# Patient Record
Sex: MALE | Race: WHITE
[De-identification: names, ages, dates, MRNs, and addresses within clinical notes are randomized per-mention and may not be internally consistent; named-entity substitution may affect disease eponyms.]

---

## 2017-12-11 PROBLEM — Z00.00 ENCOUNTER FOR PREVENTIVE HEALTH EXAMINATION: Status: ACTIVE | Noted: 2017-12-11

## 2022-06-18 ENCOUNTER — NON-APPOINTMENT (OUTPATIENT)
Age: 62
End: 2022-06-18

## 2022-07-14 ENCOUNTER — NON-APPOINTMENT (OUTPATIENT)
Age: 62
End: 2022-07-14

## 2022-11-16 ENCOUNTER — APPOINTMENT (OUTPATIENT)
Dept: ORTHOPEDIC SURGERY | Facility: CLINIC | Age: 62
End: 2022-11-16

## 2022-11-16 VITALS — BODY MASS INDEX: 31.5 KG/M2 | WEIGHT: 225 LBS | HEIGHT: 71 IN

## 2022-11-16 DIAGNOSIS — I10 ESSENTIAL (PRIMARY) HYPERTENSION: ICD-10-CM

## 2022-11-16 DIAGNOSIS — M25.611 STIFFNESS OF RIGHT SHOULDER, NOT ELSEWHERE CLASSIFIED: ICD-10-CM

## 2022-11-16 DIAGNOSIS — E78.00 PURE HYPERCHOLESTEROLEMIA, UNSPECIFIED: ICD-10-CM

## 2022-11-16 PROCEDURE — 73010 X-RAY EXAM OF SHOULDER BLADE: CPT | Mod: RT

## 2022-11-16 PROCEDURE — 99204 OFFICE O/P NEW MOD 45 MIN: CPT

## 2022-11-16 PROCEDURE — 73030 X-RAY EXAM OF SHOULDER: CPT | Mod: RT

## 2022-11-16 RX ORDER — DICLOFENAC SODIUM AND MISOPROSTOL 75; 200 MG/1; UG/1
75-0.2 TABLET, DELAYED RELEASE ORAL
Qty: 60 | Refills: 0 | Status: ACTIVE | COMMUNITY
Start: 2022-11-16 | End: 1900-01-01

## 2022-11-16 NOTE — CONSULT LETTER
[Dear  ___] : Dear  [unfilled], [Consult Letter:] : I had the pleasure of evaluating your patient, [unfilled]. [Please see my note below.] : Please see my note below. [Consult Closing:] : Thank you very much for allowing me to participate in the care of this patient.  If you have any questions, please do not hesitate to contact me. [Sincerely,] : Sincerely, [FreeTextEntry3] : Shamar Poole M.D.\[facility] Shoulder Surgery

## 2022-11-16 NOTE — PHYSICAL EXAM
[Right] : right shoulder [Left] : left shoulder [Moderate] : moderate [Mild] : mild [5 ___] : forward flexion 5[unfilled]/5 [] : no sensory deficits [Sitting] : sitting [FreeTextEntry9] : IR to T10. [TWNoteComboBox6] : internal rotation L2 [TWNoteComboBox4] : passive forward flexion 160 degrees [de-identified] : external rotation 50 degrees

## 2022-11-16 NOTE — REASON FOR VISIT
[FreeTextEntry2] : This is a 61 year old RHD male retired  with right shoulder pain since mid October 2022 without injury. Pain is to the posterior aspect of the shoulder. No prior history/treatment. Reaching overhead is painful. Night symptoms can occur. There is no n/t. NSAID use as needed with temporary relief.

## 2022-11-16 NOTE — ASSESSMENT
[FreeTextEntry1] : We discussed the underlying pathology. \par Treatment options reviewed. \par PT is prescribed. \par Arthrotec is prescribed.\par Cautions discussed. \par Questions answered. \par \par Patient seen by Shamar Alaniz M.D.\par Entered by Sharona Ritchie acting as scribe.

## 2022-11-16 NOTE — IMAGING
[Right] : right shoulder [FreeTextEntry1] : The GH joint is ok. There are AC cysts and spurs.  [FreeTextEntry5] : There is a type II acromion.

## 2022-11-16 NOTE — HISTORY OF PRESENT ILLNESS
[9] : 9 [0] : 0 [Dull/Aching] : dull/aching [Sharp] : sharp [Intermittent] : intermittent [Retired] : Work status: retired [] : no [FreeTextEntry1] : righ sergio   arm [FreeTextEntry9] : no treatment [de-identified] : reaching and lifting

## 2022-11-17 ENCOUNTER — APPOINTMENT (OUTPATIENT)
Dept: ORTHOPEDIC SURGERY | Facility: CLINIC | Age: 62
End: 2022-11-17

## 2022-12-21 ENCOUNTER — APPOINTMENT (OUTPATIENT)
Dept: ORTHOPEDIC SURGERY | Facility: CLINIC | Age: 62
End: 2022-12-21

## 2023-01-10 ENCOUNTER — NON-APPOINTMENT (OUTPATIENT)
Age: 63
End: 2023-01-10

## 2023-01-18 ENCOUNTER — APPOINTMENT (OUTPATIENT)
Dept: ORTHOPEDIC SURGERY | Facility: CLINIC | Age: 63
End: 2023-01-18

## 2023-05-10 ENCOUNTER — FORM ENCOUNTER (OUTPATIENT)
Age: 63
End: 2023-05-10

## 2023-05-10 ENCOUNTER — APPOINTMENT (OUTPATIENT)
Dept: ORTHOPEDIC SURGERY | Facility: CLINIC | Age: 63
End: 2023-05-10
Payer: COMMERCIAL

## 2023-05-10 PROCEDURE — 99214 OFFICE O/P EST MOD 30 MIN: CPT

## 2023-05-10 PROCEDURE — 72170 X-RAY EXAM OF PELVIS: CPT

## 2023-05-10 PROCEDURE — 72110 X-RAY EXAM L-2 SPINE 4/>VWS: CPT

## 2023-05-10 RX ORDER — METHYLPREDNISOLONE 4 MG/1
4 TABLET ORAL
Qty: 1 | Refills: 0 | Status: ACTIVE | COMMUNITY
Start: 2023-05-10 | End: 1900-01-01

## 2023-05-10 NOTE — DISCUSSION/SUMMARY
[de-identified] : reviewed the imaging \par questions answered \par MRi L spine \par PT \par MPD \par fu to review

## 2023-05-10 NOTE — HISTORY OF PRESENT ILLNESS
[Lower back] : lower back [Gradual] : gradual [Sudden] : sudden [7] : 7 [5] : 5 [Burning] : burning [Shooting] : shooting [Stabbing] : stabbing [Intermittent] : intermittent [Rest] : rest [Exercising] : exercising [Retired] : Work status: retired [de-identified] : 5/10/23: 63 yo RHD m here today for the evaluation of his low back; Pain to the right side of the low back that has been intermittent since 2022. Pain is inconsistent. No leg pain. Pain is relieved while in bed on laying on his side. No n/t.  No loss of b/b  control \par \par No prior spine injuries\par No RYAN/prior spine surgeries\par Took aleve once\par No recent PT/Chirocare\par No recent MRi \par tried a brace for a day or so \par \par Pmhx: HTN, Hyperlipidemia\par No hx diabetes/cancer\par \par retired  \par \par xrays today\par L-spine 4V; lumbar spondylosis\par AP pelvis; no fractures  [] : Post Surgical Visit: no [FreeTextEntry1] : L spine  [FreeTextEntry5] : Pt has had a gradual onset of l spine pain for the past year and a half that has gotten worse over time, pt has a sharp pain on the right side of his l spine with sitting for a long period of time  [de-identified] : none

## 2023-05-11 ENCOUNTER — APPOINTMENT (OUTPATIENT)
Dept: MRI IMAGING | Facility: CLINIC | Age: 63
End: 2023-05-11
Payer: COMMERCIAL

## 2023-05-11 PROCEDURE — 72148 MRI LUMBAR SPINE W/O DYE: CPT

## 2023-05-17 ENCOUNTER — APPOINTMENT (OUTPATIENT)
Dept: ORTHOPEDIC SURGERY | Facility: CLINIC | Age: 63
End: 2023-05-17
Payer: COMMERCIAL

## 2023-05-17 DIAGNOSIS — M51.26 OTHER INTERVERTEBRAL DISC DISPLACEMENT, LUMBAR REGION: ICD-10-CM

## 2023-05-17 DIAGNOSIS — M75.41 IMPINGEMENT SYNDROME OF RIGHT SHOULDER: ICD-10-CM

## 2023-05-17 DIAGNOSIS — M47.816 SPONDYLOSIS W/OUT MYELOPATHY OR RADICULOPATHY, LUMBAR REGION: ICD-10-CM

## 2023-05-17 PROCEDURE — 99214 OFFICE O/P EST MOD 30 MIN: CPT

## 2023-05-17 NOTE — DISCUSSION/SUMMARY
[de-identified] : reviewed the MRI \par questoins answered \par has moderate stenosis from L3-5 \par discussion of optoins \par PT would be the start\par if that not helping consider RYAN \par

## 2023-05-17 NOTE — HISTORY OF PRESENT ILLNESS
[Lower back] : lower back [Gradual] : gradual [Sudden] : sudden [7] : 7 [5] : 5 [Burning] : burning [Shooting] : shooting [Stabbing] : stabbing [Intermittent] : intermittent [Rest] : rest [Exercising] : exercising [Retired] : Work status: retired [de-identified] : 5/10/23: 61 yo RHD m here today for the evaluation of his low back; Pain to the right side of the low back that has been intermittent since 2022. Pain is inconsistent. No leg pain. Pain is relieved while in bed on laying on his side. No n/t.  No loss of b/b  control \par \par No prior spine injuries\par No RYAN/prior spine surgeries\par Took aleve once\par No recent PT/Chirocare\par No recent MRi \par tried a brace for a day or so \par \par Pmhx: HTN, Hyperlipidemia\par No hx diabetes/cancer\par \par retired  \par \par xrays today\par L-spine 4V; lumbar spondylosis\par AP pelvis; no fractures \par \par 5/17/23: Here for fu to review the MRI lumbar spine - plan at last was "reviewed the imaging \par questions answered \par MRi L spine \par PT \par MPD \par fu to review" - overall some relief with the oral steroids \par \par MRI L-spine 5/11/23\par 1. Convex left curvature.\par 2. Modic type 1 endplate change at the anterior margin of L2-L3 to the right of midline with no fracture.\par 3. T12-L1: Loss of disc signal and height.\par 4. L1-L2: Loss of disc signal and height. Broad bulge, facet arthrosis, ligamentum flavum hypertrophy, and\par facet effusion.\par 5. L2-L3: Broad bulge, facet arthrosis, and ligamentum flavum hypertrophy with facet effusion. Foraminal \par stenosis.\par 6. L3-L4: Minor retrolisthesis. Broad bulge, facet arthrosis, ligamentum flavum hypertrophy, and facet effusion\par with foraminal extension of bulges, left foraminal herniation, annular fissure, and foraminal stenosis left greater \par than right. Mild central stenosis.\par 7. L4-L5: Broad bulge, spondylotic ridge, facet arthrosis, and ligamentum flavum hypertrophy with inferior\par foraminal stenosis. Posterior Modic type 2 endplate change. Broad herniation impressing on the thecal sac with\par mild central stenosis.\par 8. L5-S1: Broad bulge and facet arthrosis. Broad central herniation impressing on the thecal sac with no mass \par effect on the S1 nerve roots. [] : Post Surgical Visit: no [FreeTextEntry1] : L spine  [FreeTextEntry5] : Pt has had a gradual onset of l spine pain for the past year and a half that has gotten worse over time, pt has a sharp pain on the right side of his l spine with sitting for a long period of time  [de-identified] : none

## 2024-01-21 ENCOUNTER — OFFICE (OUTPATIENT)
Dept: URBAN - METROPOLITAN AREA CLINIC 1 | Facility: CLINIC | Age: 64
Setting detail: OPHTHALMOLOGY
End: 2024-01-21
Payer: COMMERCIAL

## 2024-01-21 DIAGNOSIS — B30.9: ICD-10-CM

## 2024-01-21 DIAGNOSIS — G90.2: ICD-10-CM

## 2024-01-21 PROCEDURE — 99203 OFFICE O/P NEW LOW 30 MIN: CPT

## 2024-01-21 ASSESSMENT — REFRACTION_AUTOREFRACTION
OD_AXIS: 075
OD_SPHERE: +0.25
OS_SPHERE: PLANO
OS_CYLINDER: -0.25
OS_AXIS: 085
OD_CYLINDER: -1.25

## 2024-01-21 ASSESSMENT — REFRACTION_CURRENTRX
OD_CYLINDER: -1.50
OD_ADD: +2.50
OD_VPRISM_DIRECTION: PROGS
OS_ADD: +2.50
OD_OVR_VA: 20/
OS_VPRISM_DIRECTION: PROGS
OS_AXIS: 099
OS_SPHERE: +0.25
OD_AXIS: 081
OS_CYLINDER: -1.25
OD_SPHERE: PLANO
OS_OVR_VA: 20/

## 2024-01-21 ASSESSMENT — CONFRONTATIONAL VISUAL FIELD TEST (CVF)
OD_FINDINGS: FULL
OS_FINDINGS: FULL

## 2024-01-21 ASSESSMENT — SPHEQUIV_DERIVED: OD_SPHEQUIV: -0.375

## 2024-02-05 ENCOUNTER — OFFICE (OUTPATIENT)
Dept: URBAN - METROPOLITAN AREA CLINIC 6 | Facility: CLINIC | Age: 64
Setting detail: OPHTHALMOLOGY
End: 2024-02-05
Payer: COMMERCIAL

## 2024-02-05 DIAGNOSIS — G90.2: ICD-10-CM

## 2024-02-05 DIAGNOSIS — B30.9: ICD-10-CM

## 2024-02-05 PROCEDURE — 99213 OFFICE O/P EST LOW 20 MIN: CPT

## 2024-02-05 ASSESSMENT — REFRACTION_CURRENTRX
OS_SPHERE: +0.25
OD_AXIS: 082
OS_CYLINDER: -1.00
OS_VPRISM_DIRECTION: PROGS
OS_AXIS: 112
OD_OVR_VA: 20/
OS_OVR_VA: 20/
OS_ADD: +2.25
OD_ADD: +2.25
OD_CYLINDER: -1.75
OD_SPHERE: +0.25
OD_VPRISM_DIRECTION: PROGS

## 2024-02-05 ASSESSMENT — REFRACTION_AUTOREFRACTION
OS_CYLINDER: -1.50
OD_AXIS: 079
OD_CYLINDER: -1.25
OD_SPHERE: PLANO
OS_AXIS: 105
OS_SPHERE: PLANO

## 2024-02-05 ASSESSMENT — CONFRONTATIONAL VISUAL FIELD TEST (CVF)
OD_FINDINGS: FULL
OS_FINDINGS: FULL

## 2025-04-30 ENCOUNTER — TRANSCRIPTION ENCOUNTER (OUTPATIENT)
Age: 65
End: 2025-04-30

## 2025-04-30 ENCOUNTER — APPOINTMENT (OUTPATIENT)
Dept: ORTHOPEDIC SURGERY | Facility: CLINIC | Age: 65
End: 2025-04-30
Payer: MEDICARE

## 2025-04-30 VITALS — WEIGHT: 188 LBS | BODY MASS INDEX: 26.32 KG/M2 | HEIGHT: 71 IN

## 2025-04-30 DIAGNOSIS — M75.42 IMPINGEMENT SYNDROME OF LEFT SHOULDER: ICD-10-CM

## 2025-04-30 DIAGNOSIS — Z78.9 OTHER SPECIFIED HEALTH STATUS: ICD-10-CM

## 2025-04-30 DIAGNOSIS — M25.612 STIFFNESS OF LEFT SHOULDER, NOT ELSEWHERE CLASSIFIED: ICD-10-CM

## 2025-04-30 DIAGNOSIS — M75.32 CALCIFIC TENDINITIS OF LEFT SHOULDER: ICD-10-CM

## 2025-04-30 PROCEDURE — 99213 OFFICE O/P EST LOW 20 MIN: CPT

## 2025-04-30 PROCEDURE — 73010 X-RAY EXAM OF SHOULDER BLADE: CPT | Mod: LT

## 2025-04-30 PROCEDURE — 73030 X-RAY EXAM OF SHOULDER: CPT | Mod: LT

## 2025-04-30 RX ORDER — ROSUVASTATIN CALCIUM 5 MG/1
TABLET, FILM COATED ORAL
Refills: 0 | Status: ACTIVE | COMMUNITY

## 2025-04-30 RX ORDER — DICLOFENAC SODIUM AND MISOPROSTOL 75; 200 MG/1; UG/1
75-0.2 TABLET, DELAYED RELEASE ORAL
Qty: 60 | Refills: 0 | Status: ACTIVE | COMMUNITY
Start: 2025-04-30 | End: 1900-01-01

## 2025-04-30 RX ORDER — LOSARTAN POTASSIUM 100 MG/1
TABLET, FILM COATED ORAL
Refills: 0 | Status: ACTIVE | COMMUNITY

## 2025-06-04 ENCOUNTER — APPOINTMENT (OUTPATIENT)
Dept: ORTHOPEDIC SURGERY | Facility: CLINIC | Age: 65
End: 2025-06-04
Payer: MEDICARE

## 2025-06-04 VITALS — WEIGHT: 188 LBS | BODY MASS INDEX: 26.32 KG/M2 | HEIGHT: 71 IN

## 2025-06-04 DIAGNOSIS — M25.612 STIFFNESS OF LEFT SHOULDER, NOT ELSEWHERE CLASSIFIED: ICD-10-CM

## 2025-06-04 DIAGNOSIS — M75.42 IMPINGEMENT SYNDROME OF LEFT SHOULDER: ICD-10-CM

## 2025-06-04 PROCEDURE — 20611 DRAIN/INJ JOINT/BURSA W/US: CPT | Mod: 59,LT

## 2025-06-04 PROCEDURE — 99214 OFFICE O/P EST MOD 30 MIN: CPT | Mod: 25

## 2025-07-16 ENCOUNTER — RESULT REVIEW (OUTPATIENT)
Age: 65
End: 2025-07-16

## 2025-07-16 ENCOUNTER — APPOINTMENT (OUTPATIENT)
Dept: ORTHOPEDIC SURGERY | Facility: CLINIC | Age: 65
End: 2025-07-16
Payer: MEDICARE

## 2025-07-16 VITALS — WEIGHT: 188 LBS | BODY MASS INDEX: 26.32 KG/M2 | HEIGHT: 71 IN

## 2025-07-16 PROCEDURE — 99213 OFFICE O/P EST LOW 20 MIN: CPT

## 2025-07-23 ENCOUNTER — APPOINTMENT (OUTPATIENT)
Dept: ORTHOPEDIC SURGERY | Facility: CLINIC | Age: 65
End: 2025-07-23
Payer: MEDICARE

## 2025-07-23 PROCEDURE — 73030 X-RAY EXAM OF SHOULDER: CPT | Mod: LT

## 2025-07-23 PROCEDURE — 99214 OFFICE O/P EST MOD 30 MIN: CPT

## 2025-07-28 ENCOUNTER — APPOINTMENT (OUTPATIENT)
Dept: MRI IMAGING | Facility: CLINIC | Age: 65
End: 2025-07-28

## 2025-07-28 ENCOUNTER — APPOINTMENT (OUTPATIENT)
Dept: ORTHOPEDIC SURGERY | Facility: CLINIC | Age: 65
End: 2025-07-28
Payer: MEDICARE

## 2025-07-28 VITALS — HEIGHT: 71 IN | BODY MASS INDEX: 26.32 KG/M2 | WEIGHT: 188 LBS

## 2025-07-28 PROCEDURE — 99213 OFFICE O/P EST LOW 20 MIN: CPT

## 2025-07-28 PROCEDURE — 73030 X-RAY EXAM OF SHOULDER: CPT | Mod: LT

## 2025-07-28 RX ORDER — METHYLPREDNISOLONE 4 MG/1
4 TABLET ORAL
Qty: 1 | Refills: 0 | Status: ACTIVE | COMMUNITY
Start: 2025-07-28 | End: 1900-01-01

## 2025-07-29 ENCOUNTER — RESULT REVIEW (OUTPATIENT)
Age: 65
End: 2025-07-29

## 2025-07-31 ENCOUNTER — RESULT REVIEW (OUTPATIENT)
Age: 65
End: 2025-07-31

## 2025-08-11 ENCOUNTER — APPOINTMENT (OUTPATIENT)
Dept: ORTHOPEDIC SURGERY | Facility: CLINIC | Age: 65
End: 2025-08-11
Payer: MEDICARE

## 2025-08-11 VITALS — HEIGHT: 71 IN | BODY MASS INDEX: 26.32 KG/M2 | WEIGHT: 188 LBS

## 2025-08-11 DIAGNOSIS — M75.32 CALCIFIC TENDINITIS OF LEFT SHOULDER: ICD-10-CM

## 2025-08-11 DIAGNOSIS — M25.612 STIFFNESS OF LEFT SHOULDER, NOT ELSEWHERE CLASSIFIED: ICD-10-CM

## 2025-08-11 DIAGNOSIS — M75.42 IMPINGEMENT SYNDROME OF LEFT SHOULDER: ICD-10-CM

## 2025-08-11 PROCEDURE — 73030 X-RAY EXAM OF SHOULDER: CPT | Mod: LT

## 2025-08-11 PROCEDURE — 99214 OFFICE O/P EST MOD 30 MIN: CPT

## 2025-08-11 RX ORDER — CELECOXIB 200 MG/1
200 CAPSULE ORAL DAILY
Qty: 30 | Refills: 0 | Status: ACTIVE | COMMUNITY
Start: 2025-08-11 | End: 1900-01-01